# Patient Record
Sex: FEMALE | Race: WHITE | NOT HISPANIC OR LATINO
[De-identification: names, ages, dates, MRNs, and addresses within clinical notes are randomized per-mention and may not be internally consistent; named-entity substitution may affect disease eponyms.]

---

## 2020-04-26 ENCOUNTER — MESSAGE (OUTPATIENT)
Age: 65
End: 2020-04-26

## 2020-05-06 ENCOUNTER — APPOINTMENT (OUTPATIENT)
Dept: DISASTER EMERGENCY | Facility: HOSPITAL | Age: 65
End: 2020-05-06

## 2020-05-07 LAB
SARS-COV-2 IGG SERPL IA-ACNC: 2.2 AU/ML
SARS-COV-2 IGG SERPL QL IA: NEGATIVE

## 2020-07-20 PROBLEM — Z00.00 ENCOUNTER FOR PREVENTIVE HEALTH EXAMINATION: Status: ACTIVE | Noted: 2020-07-20

## 2020-10-08 DIAGNOSIS — Z20.828 CONTACT WITH AND (SUSPECTED) EXPOSURE TO OTHER VIRAL COMMUNICABLE DISEASES: ICD-10-CM

## 2020-10-12 ENCOUNTER — OUTPATIENT (OUTPATIENT)
Dept: OUTPATIENT SERVICES | Facility: HOSPITAL | Age: 65
LOS: 1 days | End: 2020-10-12
Payer: MEDICARE

## 2020-10-12 DIAGNOSIS — Z11.59 ENCOUNTER FOR SCREENING FOR OTHER VIRAL DISEASES: ICD-10-CM

## 2020-10-12 LAB — SARS-COV-2 RNA SPEC QL NAA+PROBE: SIGNIFICANT CHANGE UP

## 2020-10-12 PROCEDURE — U0003: CPT

## 2020-10-26 ENCOUNTER — TRANSCRIPTION ENCOUNTER (OUTPATIENT)
Age: 65
End: 2020-10-26

## 2022-06-30 ENCOUNTER — NON-APPOINTMENT (OUTPATIENT)
Age: 67
End: 2022-06-30

## 2022-09-19 RX ORDER — AZITHROMYCIN 250 MG/1
250 TABLET, FILM COATED ORAL
Qty: 1 | Refills: 0 | Status: ACTIVE | COMMUNITY
Start: 2022-09-19 | End: 1900-01-01

## 2025-05-13 ENCOUNTER — NON-APPOINTMENT (OUTPATIENT)
Age: 70
End: 2025-05-13

## 2025-05-14 ENCOUNTER — APPOINTMENT (OUTPATIENT)
Dept: ORTHOPEDIC SURGERY | Facility: CLINIC | Age: 70
End: 2025-05-14
Payer: MEDICARE

## 2025-05-14 DIAGNOSIS — S42.031A DISPLACED FRACTURE OF LATERAL END OF RIGHT CLAVICLE, INITIAL ENCOUNTER FOR CLOSED FRACTURE: ICD-10-CM

## 2025-05-14 DIAGNOSIS — S42.002A FRACTURE OF UNSPECIFIED PART OF LEFT CLAVICLE, INITIAL ENCOUNTER FOR CLOSED FRACTURE: ICD-10-CM

## 2025-05-14 PROCEDURE — 99205 OFFICE O/P NEW HI 60 MIN: CPT

## 2025-05-14 RX ORDER — CHLORHEXIDINE GLUCONATE 4 G/100ML
4 SOLUTION TOPICAL
Qty: 1 | Refills: 0 | Status: ACTIVE | COMMUNITY
Start: 2025-05-14 | End: 1900-01-01

## 2025-05-15 NOTE — PRE PROCEDURE NOTE - PRE PROCEDURE EVALUATION
ASHISH NG is a 69 year old female being seen for an initial visit for.  ?  History of Present Illness          History of Present Illness  69yF pw L clavicle frx after a fall. Referred by Vicky Cedeno - pt notes moderate pain and swelling.  Extremely active with yoga, wants to keep optimal mobility and function. No n/p  ?  Active Problems  Exposure to COVID-19 virus (V01.79) (Z20.822)           ?  Past Medical History  History of Closed displaced fracture of acromial end of right clavicle, initial encounter  (810.03) (S42.031A)           Current Meds  Azithromycin 250 MG Oral Tablet; TAKE AS DIRECTED PER PACKAGE INSTRUCTIONS           ?  Allergies  No Known Drug Allergies           ?  Physical Exam       Gen: NAD  Resp: Nonlabored respirations, no SOB  ?  Shoulder:  Skin intact  Tender over mid shaft clavicle, ecchymosis  shoulder arom deferred, full elbow/hand rom  firing D B T EPL FDP IO  SILT amur  2+ rad bcr       Imaging: [facility] - I independently reviewed and interpreted the xrays - 150% displaced midshaft clavicle fracture with Z frx comminution, no dislocation or OA. No other acute osseous abnormalities.     ?  Discussion/Summary       69yF pw L 150% displaced midshaft clavicle frx. The patient was extensively counseled on treatment options including but not limited to observation, rest/activity modification, bracing, anti-inflammatory medications, physical therapy, injections, and surgery. The natural history of the disease was thoroughly explained.  ?  The risks, benefits and alternatives to surgery were discussed. The patient understands the risks include but are not limited to bleeding, infection, wound healing issues, damage to surrounding structures including nerves and arteries, revision surgery, symptomatic hardware, malunion, nonunion and the inability of the surgery to reduce the pain. No guarantees were given regarding the surgery. They understand there is a risk of loss of limb, extremity and life. They understand that the chronicity of the injury may lead to increased scarring of surrounding structures and that I may need to utilize a biologic to enhance the healing of the nonunion site.  ?  We discussed the proposed rehabilitation timeline as well as expected postoperative restrictions. The patient voiced a good understanding of treatment options, risks and benefits, postoperative instructions, rehabilitation timeline, and restrictions. The patient was given the opportunity to ask questions, which were all answered to the best of my ability and to their satisfaction. The patient will work with my office to arrange a time for surgery and obtain any medical clearance information necessary. The patient expressed understanding of his diagnosis and treatment plan and all questions were answered.  ?  We will plan to proceed with a left clavicle open reduction internal fixation  ?  I have personally obtained the history, reviewed the ROS as noted, and performed the physical examination today. The patient and I discussed the assessment and options and developed the plan. All questions were answered and the patient stated their understanding of the treatment plan and appreciation of the visit.  ?  My cumulative time spent on this patient's visit included: Preparation for the visit, review of the medical records, review of pertinent diagnostic studies, examination and counseling of the patient on the above diagnosis, treatment plan and prognosis, orders of diagnostic tests, medications and/or appropriate procedures and documentation in the medical records of today's visit.  ?  Manoj Puri MD.  ?  Assessment  Fracture of left clavicle (810.00) (S42.002A)           Plan  Fracture of left clavicle  Surgery Booking Form Inpatient  left clavicle open reduction internal fixation  Status:  Need Information - Required information  Requested for: [id number]  Did the patient receive COVID 19 vaccine? : Yes, Proof submitted  ERP (Enhanced Recovery Program) : No  Medical Evaluation : Yes  PST Triage Evaluation : PST Appt  Admission Type : OP  Anesthesia Alert : N  Anesthesia Type : General  Laterality : Left  See Attached Form : large C arm, hood clavicle  Special Equipment : Y  Length of Procedure : 1.5h  CPT Codes : 80996       Electronically signed by : MANOJ PURI MD; May 14 2025  9:59AM Eastern Standard Time (Author)    ? ASHISH NG is a 69 year old female being seen for an initial visit for.  ?  History of Present Illness          History of Present Illness  69yF pw L clavicle frx after a fall. Referred by Vicky Cedeno - pt notes moderate pain and swelling.  Extremely active with yoga, wants to keep optimal mobility and function. No n/p  ?  Active Problems  Exposure to COVID-19 virus (V01.79) (Z20.822)           ?  Past Medical History  History of Closed displaced fracture of acromial end of LEFT clavicle, initial encounter  (810.03) (S42.031A)           Current Meds  Azithromycin 250 MG Oral Tablet; TAKE AS DIRECTED PER PACKAGE INSTRUCTIONS           ?  Allergies  No Known Drug Allergies           ?  Physical Exam       Gen: NAD  Resp: Nonlabored respirations, no SOB  ?  L Shoulder:  Skin intact  Tender over mid shaft clavicle, ecchymosis  shoulder arom deferred, full elbow/hand rom  firing D B T EPL FDP IO  SILT amur  2+ rad bcr       Imaging: [facility] - I independently reviewed and interpreted the xrays - 150% displaced midshaft clavicle fracture with Z frx comminution, no dislocation or OA. No other acute osseous abnormalities.     ?  Discussion/Summary       69yF pw L 150% displaced midshaft clavicle frx. The patient was extensively counseled on treatment options including but not limited to observation, rest/activity modification, bracing, anti-inflammatory medications, physical therapy, injections, and surgery. The natural history of the disease was thoroughly explained.  ?  The risks, benefits and alternatives to surgery were discussed. The patient understands the risks include but are not limited to bleeding, infection, wound healing issues, damage to surrounding structures including nerves and arteries, revision surgery, symptomatic hardware, malunion, nonunion and the inability of the surgery to reduce the pain. No guarantees were given regarding the surgery. They understand there is a risk of loss of limb, extremity and life. They understand that the chronicity of the injury may lead to increased scarring of surrounding structures and that I may need to utilize a biologic to enhance the healing of the nonunion site.  ?  We discussed the proposed rehabilitation timeline as well as expected postoperative restrictions. The patient voiced a good understanding of treatment options, risks and benefits, postoperative instructions, rehabilitation timeline, and restrictions. The patient was given the opportunity to ask questions, which were all answered to the best of my ability and to their satisfaction. The patient will work with my office to arrange a time for surgery and obtain any medical clearance information necessary. The patient expressed understanding of his diagnosis and treatment plan and all questions were answered.  ?  We will plan to proceed with a left clavicle open reduction internal fixation  ?  I have personally obtained the history, reviewed the ROS as noted, and performed the physical examination today. The patient and I discussed the assessment and options and developed the plan. All questions were answered and the patient stated their understanding of the treatment plan and appreciation of the visit.  ?  My cumulative time spent on this patient's visit included: Preparation for the visit, review of the medical records, review of pertinent diagnostic studies, examination and counseling of the patient on the above diagnosis, treatment plan and prognosis, orders of diagnostic tests, medications and/or appropriate procedures and documentation in the medical records of today's visit.  ?  Manoj Puri MD.  ?  Assessment  Fracture of left clavicle (810.00) (S42.002A)           Plan  Fracture of left clavicle  Surgery Booking Form Inpatient  left clavicle open reduction internal fixation  Status:  Need Information - Required information  Requested for: [id number]  Did the patient receive COVID 19 vaccine? : Yes, Proof submitted  ERP (Enhanced Recovery Program) : No  Medical Evaluation : Yes  PST Triage Evaluation : PST Appt  Admission Type : OP  Anesthesia Alert : N  Anesthesia Type : General  Laterality : Left  See Attached Form : large C arm, hood clavicle  Special Equipment : Y  Length of Procedure : 1.5h  CPT Codes : 32597       Electronically signed by : MANOJ PURI MD; May 14 2025  9:59AM Eastern Standard Time (Author)    ?

## 2025-05-16 ENCOUNTER — APPOINTMENT (OUTPATIENT)
Dept: ORTHOPEDIC SURGERY | Facility: HOSPITAL | Age: 70
End: 2025-05-16

## 2025-05-16 ENCOUNTER — OUTPATIENT (OUTPATIENT)
Dept: OUTPATIENT SERVICES | Facility: HOSPITAL | Age: 70
LOS: 1 days | End: 2025-05-16
Payer: MEDICARE

## 2025-05-16 ENCOUNTER — TRANSCRIPTION ENCOUNTER (OUTPATIENT)
Age: 70
End: 2025-05-16

## 2025-05-16 VITALS
OXYGEN SATURATION: 99 % | DIASTOLIC BLOOD PRESSURE: 70 MMHG | HEART RATE: 79 BPM | RESPIRATION RATE: 17 BRPM | TEMPERATURE: 98 F | SYSTOLIC BLOOD PRESSURE: 129 MMHG

## 2025-05-16 VITALS
RESPIRATION RATE: 15 BRPM | HEIGHT: 66 IN | DIASTOLIC BLOOD PRESSURE: 79 MMHG | TEMPERATURE: 97 F | SYSTOLIC BLOOD PRESSURE: 149 MMHG | HEART RATE: 69 BPM | WEIGHT: 148.37 LBS

## 2025-05-16 DIAGNOSIS — S42.002A FRACTURE OF UNSPECIFIED PART OF LEFT CLAVICLE, INITIAL ENCOUNTER FOR CLOSED FRACTURE: ICD-10-CM

## 2025-05-16 DIAGNOSIS — Z98.51 TUBAL LIGATION STATUS: Chronic | ICD-10-CM

## 2025-05-16 PROCEDURE — 23515 OPTX CLAVICULAR FX W/INT FIX: CPT | Mod: LT

## 2025-05-16 PROCEDURE — C1713: CPT

## 2025-05-16 PROCEDURE — 23515 OPTX CLAVICULAR FX W/INT FIX: CPT | Mod: AS,LT

## 2025-05-16 PROCEDURE — 76000 FLUOROSCOPY <1 HR PHYS/QHP: CPT

## 2025-05-16 DEVICE — SCREW LOCKING 2.7X14MM: Type: IMPLANTABLE DEVICE | Site: LEFT | Status: FUNCTIONAL

## 2025-05-16 DEVICE — SCREW LOKG 2.7X14MM: Type: IMPLANTABLE DEVICE | Site: LEFT | Status: FUNCTIONAL

## 2025-05-16 DEVICE — SCREW 2.7X16MM: Type: IMPLANTABLE DEVICE | Site: LEFT | Status: FUNCTIONAL

## 2025-05-16 DEVICE — SCREW 2.7X18MM: Type: IMPLANTABLE DEVICE | Site: LEFT | Status: FUNCTIONAL

## 2025-05-16 DEVICE — IMPLANTABLE DEVICE: Type: IMPLANTABLE DEVICE | Site: LEFT | Status: FUNCTIONAL

## 2025-05-16 DEVICE — SCREW LOCKING 2.7X12MM: Type: IMPLANTABLE DEVICE | Site: LEFT | Status: FUNCTIONAL

## 2025-05-16 DEVICE — K-WIRE STRYKER 1.6MM X 150MM: Type: IMPLANTABLE DEVICE | Site: LEFT | Status: FUNCTIONAL

## 2025-05-16 RX ORDER — ONDANSETRON HCL/PF 4 MG/2 ML
4 VIAL (ML) INJECTION ONCE
Refills: 0 | Status: COMPLETED | OUTPATIENT
Start: 2025-05-16 | End: 2025-05-16

## 2025-05-16 RX ORDER — GABAPENTIN 400 MG/1
1 CAPSULE ORAL
Qty: 21 | Refills: 0
Start: 2025-05-16 | End: 2025-05-22

## 2025-05-16 RX ORDER — SODIUM CHLORIDE 9 G/1000ML
1000 INJECTION, SOLUTION INTRAVENOUS
Refills: 0 | Status: DISCONTINUED | OUTPATIENT
Start: 2025-05-16 | End: 2025-05-16

## 2025-05-16 RX ORDER — CEFAZOLIN SODIUM IN 0.9 % NACL 3 G/100 ML
2000 INTRAVENOUS SOLUTION, PIGGYBACK (ML) INTRAVENOUS ONCE
Refills: 0 | Status: COMPLETED | OUTPATIENT
Start: 2025-05-16 | End: 2025-05-16

## 2025-05-16 RX ORDER — APREPITANT 40 MG/1
40 CAPSULE ORAL ONCE
Refills: 0 | Status: COMPLETED | OUTPATIENT
Start: 2025-05-16 | End: 2025-05-16

## 2025-05-16 RX ORDER — HYDROMORPHONE/SOD CHLOR,ISO/PF 2 MG/10 ML
0.5 SYRINGE (ML) INJECTION
Refills: 0 | Status: DISCONTINUED | OUTPATIENT
Start: 2025-05-16 | End: 2025-05-16

## 2025-05-16 RX ORDER — LEVOTHYROXINE SODIUM 300 MCG
1 TABLET ORAL
Refills: 0 | DISCHARGE

## 2025-05-16 RX ORDER — HYDROMORPHONE/SOD CHLOR,ISO/PF 2 MG/10 ML
0.2 SYRINGE (ML) INJECTION
Refills: 0 | Status: DISCONTINUED | OUTPATIENT
Start: 2025-05-16 | End: 2025-05-16

## 2025-05-16 RX ORDER — OXYCODONE HYDROCHLORIDE 30 MG/1
1 TABLET ORAL
Qty: 20 | Refills: 0
Start: 2025-05-16

## 2025-05-16 RX ORDER — OXYCODONE HYDROCHLORIDE 30 MG/1
5 TABLET ORAL ONCE
Refills: 0 | Status: DISCONTINUED | OUTPATIENT
Start: 2025-05-16 | End: 2025-05-16

## 2025-05-16 RX ORDER — IBUPROFEN 200 MG
1 TABLET ORAL
Qty: 20 | Refills: 0
Start: 2025-05-16 | End: 2025-05-20

## 2025-05-16 RX ADMIN — SODIUM CHLORIDE 75 MILLILITER(S): 9 INJECTION, SOLUTION INTRAVENOUS at 10:53

## 2025-05-16 RX ADMIN — APREPITANT 40 MILLIGRAM(S): 40 CAPSULE ORAL at 07:45

## 2025-05-16 RX ADMIN — Medication 1 APPLICATION(S): at 07:50

## 2025-05-16 RX ADMIN — Medication 4 MILLIGRAM(S): at 11:08

## 2025-05-16 NOTE — ASU DISCHARGE PLAN (ADULT/PEDIATRIC) - PROVIDER TOKENS
PROVIDER:[TOKEN:[[id number]],SCHEDULEDAPPT:[05/28/2025],SCHEDULEDAPPTTIME:[10:45 AM],ESTABLISHEDPATIENT:[T]]

## 2025-05-16 NOTE — ASU DISCHARGE PLAN (ADULT/PEDIATRIC) - CARE PROVIDER_API CALL
Felix Puri.  Orthopaedic Surgery  [address], [address]  [place] [address]  Phone: [phone]  Fax: [phone]  Established Patient  Scheduled Appointment: 05/28/2025 10:45 AM

## 2025-05-16 NOTE — BRIEF OPERATIVE NOTE - NSICDXBRIEFPROCEDURE_GEN_ALL_CORE_FT
PROCEDURES:  Open reduction and internal fixation of fracture of left clavicle 16-May-2025 10:29:33  Ino Figueredo

## 2025-05-16 NOTE — ASU DISCHARGE PLAN (ADULT/PEDIATRIC) - ASU DC SPECIAL INSTRUCTIONSFT
Follow instructions in the attached brochure for ice packs, bandage care, & shoulder exercises.  Elevate the Left arm in sling daily.   You may take sling off to take a shower.  May open and close fingers, and bend wrist up and down.  Apply ice packs to the surgical shoulder for 30 minutes every 2 or 3 hours daily.  Start Physical Therapy as prescribed as soon as possible.  Removal of stitches will be at your follow up appointment at Dr. Puri's office.

## 2025-05-16 NOTE — ASU PATIENT PROFILE, ADULT - FALL HARM RISK - RISK INTERVENTIONS

## 2025-05-16 NOTE — ASU DISCHARGE PLAN (ADULT/PEDIATRIC) - FINANCIAL ASSISTANCE
[facility] provides services at a reduced cost to those who are determined to be eligible through [facility]’s financial assistance program. Information regarding [facility]’s financial assistance program can be found by going to [email or web address] or by calling [phone].

## 2025-05-28 ENCOUNTER — APPOINTMENT (OUTPATIENT)
Dept: ORTHOPEDIC SURGERY | Facility: CLINIC | Age: 70
End: 2025-05-28
Payer: MEDICARE

## 2025-05-28 ENCOUNTER — NON-APPOINTMENT (OUTPATIENT)
Age: 70
End: 2025-05-28

## 2025-05-28 DIAGNOSIS — S42.002A FRACTURE OF UNSPECIFIED PART OF LEFT CLAVICLE, INITIAL ENCOUNTER FOR CLOSED FRACTURE: ICD-10-CM

## 2025-05-28 PROBLEM — E03.9 HYPOTHYROIDISM, UNSPECIFIED: Chronic | Status: ACTIVE | Noted: 2025-05-16

## 2025-05-28 PROCEDURE — 73000 X-RAY EXAM OF COLLAR BONE: CPT | Mod: LT

## 2025-05-28 PROCEDURE — 99024 POSTOP FOLLOW-UP VISIT: CPT

## 2025-07-09 ENCOUNTER — APPOINTMENT (OUTPATIENT)
Dept: ORTHOPEDIC SURGERY | Facility: CLINIC | Age: 70
End: 2025-07-09
Payer: MEDICARE

## 2025-07-09 PROCEDURE — 73000 X-RAY EXAM OF COLLAR BONE: CPT | Mod: LT

## 2025-07-09 PROCEDURE — 99024 POSTOP FOLLOW-UP VISIT: CPT

## 2025-08-06 ENCOUNTER — APPOINTMENT (OUTPATIENT)
Dept: ORTHOPEDIC SURGERY | Facility: CLINIC | Age: 70
End: 2025-08-06
Payer: MEDICARE

## 2025-08-06 VITALS — BODY MASS INDEX: 24.11 KG/M2 | WEIGHT: 150 LBS | HEIGHT: 66 IN

## 2025-08-06 DIAGNOSIS — S42.002A FRACTURE OF UNSPECIFIED PART OF LEFT CLAVICLE, INITIAL ENCOUNTER FOR CLOSED FRACTURE: ICD-10-CM

## 2025-08-06 PROCEDURE — 99024 POSTOP FOLLOW-UP VISIT: CPT

## 2025-08-06 PROCEDURE — 73000 X-RAY EXAM OF COLLAR BONE: CPT | Mod: LT

## (undated) DEVICE — DRAPE C ARM UNIVERSAL

## (undated) DEVICE — PACK TOTAL HIP

## (undated) DEVICE — SOLIDIFIER CANN EXPRESS 3K

## (undated) DEVICE — SUT MONOSOF 4-0 18" C-13

## (undated) DEVICE — GLV 6.5 PROTEXIS (WHITE)

## (undated) DEVICE — POSITIONER S&N SPIDER STABILIZATION KIT SHOULDER

## (undated) DEVICE — SUT POLYSORB 2-0 30" GS-11 UNDYED

## (undated) DEVICE — DRAPE 3/4 SHEET 52X76"

## (undated) DEVICE — DRILL BIT STRYKER 2X135MM

## (undated) DEVICE — WARMING BLANKET LOWER ADULT

## (undated) DEVICE — POSITIONER STIRRUP STRAP W SLIP RING 19X3.5"

## (undated) DEVICE — SUCTION YANKAUER NO CONTROL VENT

## (undated) DEVICE — DRSG DERMABOND PRINEO 60CM

## (undated) DEVICE — SUT ORTHOCORD 2 36"

## (undated) DEVICE — DRSG COMBINE 5 X 9"

## (undated) DEVICE — POSITIONER FOAM HEAD CRADLE (PINK)

## (undated) DEVICE — SUT FIBERWIRE 2-0 18" BLUE

## (undated) DEVICE — SUT POLYSORB 1-0 30" GS-12 UNDYED

## (undated) DEVICE — DRAPE IOBAN 23" X 23"

## (undated) DEVICE — SAW BLADE MICROAIRE SAGITTAL 9.4MMX25.4MMX0.6MM

## (undated) DEVICE — SUT POLYSORB 0 30" GS-12 UNDYED

## (undated) DEVICE — CANISTER SUCTION LID GUARD 3000CC

## (undated) DEVICE — DRAPE SHOWER CURTAIN ISOLATION

## (undated) DEVICE — SOL IRR BAG NS 0.9% 3000ML

## (undated) DEVICE — SUT RETRIEVER S&N LAVENDER

## (undated) DEVICE — BLADE SCALPEL SAFETYLOCK #11

## (undated) DEVICE — SLING SHOULDER IMMOBILIZER CLINIC LARGE

## (undated) DEVICE — GLV 8 PROTEXIS (WHITE)

## (undated) DEVICE — SUT PDS II 0 36" CT-1

## (undated) DEVICE — SUT FIBERWIRE #2 38" STRAND 1 BLUE T-5 TAPER

## (undated) DEVICE — DRILL BIT STRYKER 2.7MM

## (undated) DEVICE — PREP CHLORAPREP HI-LITE ORANGE 26ML

## (undated) DEVICE — BLADE SCALPEL SAFETYLOCK #15

## (undated) DEVICE — SOL IRR POUR H2O 1500ML

## (undated) DEVICE — VENODYNE/SCD SLEEVE CALF MEDIUM

## (undated) DEVICE — SYR ASEPTO

## (undated) DEVICE — SOL IRR POUR NS 0.9% 500ML